# Patient Record
Sex: FEMALE | Race: WHITE | URBAN - METROPOLITAN AREA
[De-identification: names, ages, dates, MRNs, and addresses within clinical notes are randomized per-mention and may not be internally consistent; named-entity substitution may affect disease eponyms.]

---

## 2023-11-07 ENCOUNTER — ON-DEMAND VIRTUAL (OUTPATIENT)
Dept: URGENT CARE | Facility: CLINIC | Age: 28
End: 2023-11-07
Payer: COMMERCIAL

## 2023-11-07 DIAGNOSIS — N89.8 VAGINAL DISCHARGE: ICD-10-CM

## 2023-11-07 DIAGNOSIS — N76.0 ACUTE VAGINITIS: Primary | ICD-10-CM

## 2023-11-07 PROCEDURE — 99213 PR OFFICE/OUTPT VISIT, EST, LEVL III, 20-29 MIN: ICD-10-PCS | Mod: 95,,, | Performed by: NURSE PRACTITIONER

## 2023-11-07 PROCEDURE — 99213 OFFICE O/P EST LOW 20 MIN: CPT | Mod: 95,,, | Performed by: NURSE PRACTITIONER

## 2023-11-07 RX ORDER — CLOTRIMAZOLE AND BETAMETHASONE DIPROPIONATE 10; .64 MG/G; MG/G
CREAM TOPICAL 2 TIMES DAILY
Qty: 30 G | Refills: 0 | Status: SHIPPED | OUTPATIENT
Start: 2023-11-07 | End: 2023-11-21

## 2023-11-07 RX ORDER — FLUCONAZOLE 150 MG/1
150 TABLET ORAL ONCE
Qty: 1 TABLET | Refills: 0 | Status: SHIPPED | OUTPATIENT
Start: 2023-11-07 | End: 2023-11-07

## 2023-11-08 NOTE — PROGRESS NOTES
Subjective:      Patient ID: Karen Zepeda is a 28 y.o. female.    Vitals:  vitals were not taken for this visit.     Chief Complaint: Vaginal Itching      Visit Type: TELE AUDIOVISUAL    Present with the patient at the time of consultation: TELEMED PRESENT WITH PATIENT: colleague/friend    No past medical history on file.  No past surgical history on file.  Review of patient's allergies indicates:  Not on File  No current outpatient medications on file prior to visit.     No current facility-administered medications on file prior to visit.     No family history on file.    Medications Ordered                [id number] - [place], [place] - [address] AT  &    [address], [place] [address]    Telephone: [phone]   Fax: [phone]   Hours: Not open 24 hours                         E-Prescribed (2 of 2)              clotrimazole-betamethasone 1-0.05% (LOTRISONE) cream    Sig: Apply topically 2 (two) times daily. for 14 days       Start: 11/7/23     Quantity: 30 g Refills: 0                         fluconazole (DIFLUCAN) 150 MG Tab    Sig: Take 1 tablet (150 mg total) by mouth once. for 1 dose       Start: 11/7/23     Quantity: 1 tablet Refills: 0                           Ohs Peq Odvv Intake    11/7/2023  8:59 PM CST - Filed by Patient   Describe your reason for todays visit Uti/yeast infection   What is your current physical address in the event of a medical emergency?    Are you able to take your vital signs? No   Please attach any relevant images or files          28 year old female calls in today with complaints of vaginal irritation, intense vaginal itching and vaginal discharge. She states she thought she had a UTI so she took antibiotics that she had at home and then within 2 days of taking them she started with these symptoms. She denies a fishy odor. She states she is not concerned with STD's or pregnancy. Denies any back pain, fever or chills.        Vaginal Discharge  The patient's primary symptoms include genital itching and vaginal discharge. The patient's pertinent negatives include no genital lesions, genital odor, genital rash, missed menses, pelvic pain or vaginal bleeding. This is a new problem. The current episode started in the past 7 days. The problem occurs constantly. The problem has been rapidly worsening. The pain is moderate. The problem affects both sides. She is not pregnant. Pertinent negatives include no abdominal pain, anorexia, back pain, chills, constipation, diarrhea, discolored urine, dysuria, fever, flank pain, frequency, headaches, hematuria, joint pain, joint swelling, nausea, painful intercourse, rash, sore throat, urgency or vomiting. The vaginal discharge was copious, thick and white. There has been no bleeding. She has not been passing clots. She has not been passing tissue.       Constitution: Negative for chills and fever.   HENT:  Negative for sore throat.    Gastrointestinal:  Negative for abdominal pain, nausea, vomiting, constipation and diarrhea.   Genitourinary:  Positive for vaginal discharge. Negative for dysuria, frequency, urgency, flank pain, hematuria, missed menses and pelvic pain.   Musculoskeletal:  Negative for back pain.   Skin:  Negative for rash.   Neurological:  Negative for headaches.        Objective:   The physical exam was conducted virtually.  Physical Exam   Constitutional: She is oriented to person, place, and time. No distress.   HENT:   Head: Normocephalic and atraumatic.   Mouth/Throat: Oropharynx is clear and moist and mucous membranes are normal.   Eyes: Conjunctivae are normal. No scleral icterus.   Pulmonary/Chest: Effort normal. No respiratory distress.   Musculoskeletal: Normal range of motion.         General: Normal range of motion.   Neurological: She is alert and oriented to person, place, and time.   Skin: Skin is not diaphoretic.   Psychiatric: Her behavior is normal. Judgment and  thought content normal.   Vitals reviewed.      Assessment:     1. Acute vaginitis    2. Vaginal discharge        Plan:       Acute vaginitis  -     fluconazole (DIFLUCAN) 150 MG Tab; Take 1 tablet (150 mg total) by mouth once. for 1 dose  Dispense: 1 tablet; Refill: 0    Vaginal discharge    Other orders  -     clotrimazole-betamethasone 1-0.05% (LOTRISONE) cream; Apply topically 2 (two) times daily. for 14 days  Dispense: 30 g; Refill: 0        Discussed with patient that if she continues to have symptoms despite using cream topically and taking oral diflucan that she should seek in person exam. She agrees to plan.     Thank you for choosing [facility]!    Our goal in the [facility] is to always provide outstanding medical care. You may receive a survey by mail or e-mail in the next week regarding your experience today. We would greatly appreciate you completing and returning the survey. Your feedback provides us with a way to recognize our staff who provide very good care, and it helps us learn how to improve when your experience was below our aspiration of excellence.         We appreciate you trusting us with your medical care. We hope you feel better soon. We will be happy to take care of you for all of your future medical needs.    You must understand that you've received an Urgent Care treatment only and that you may be released before all your medical problems are known or treated. You, the patient, will arrange for follow up care as instructed.    Follow up with your PCP or specialty clinic as directed in the next 1-2 weeks if not improved or as needed.  You can call [phone] to schedule an appointment with the appropriate provider.    If your condition worsens we recommend that you receive another evaluation in person, with your primary care provider, urgent care or at the emergency room immediately or contact your primary medical clinics after hours call  service to discuss your concerns.

## 2023-11-29 ENCOUNTER — ON-DEMAND VIRTUAL (OUTPATIENT)
Dept: URGENT CARE | Facility: CLINIC | Age: 28
End: 2023-11-29
Payer: COMMERCIAL

## 2023-11-29 DIAGNOSIS — A60.00 GENITAL HERPES SIMPLEX, UNSPECIFIED SITE: ICD-10-CM

## 2023-11-29 DIAGNOSIS — L02.91 CUTANEOUS ABSCESS, UNSPECIFIED SITE: Primary | ICD-10-CM

## 2023-11-29 PROCEDURE — 99213 OFFICE O/P EST LOW 20 MIN: CPT | Mod: 95,S$GLB,, | Performed by: PHYSICIAN ASSISTANT

## 2023-11-29 PROCEDURE — 99213 PR OFFICE/OUTPT VISIT, EST, LEVL III, 20-29 MIN: ICD-10-PCS | Mod: 95,S$GLB,, | Performed by: PHYSICIAN ASSISTANT

## 2023-11-29 RX ORDER — DOXYCYCLINE HYCLATE 100 MG
100 TABLET ORAL 2 TIMES DAILY
Qty: 14 TABLET | Refills: 0 | Status: SHIPPED | OUTPATIENT
Start: 2023-11-29 | End: 2023-12-06

## 2023-11-29 RX ORDER — VALACYCLOVIR HYDROCHLORIDE 1 G/1
1000 TABLET, FILM COATED ORAL 3 TIMES DAILY
Qty: 30 TABLET | Refills: 0 | Status: SHIPPED | OUTPATIENT
Start: 2023-11-29 | End: 2023-12-09

## 2023-11-29 NOTE — PATIENT INSTRUCTIONS
Your abscess is not fluctuant so it can not be drained.  We will treat with antibiotics and continue with hot soaks and warm compresses multiple times a day.  Advil/Tylenol as needed for pain relief.  Take antibiotic with food no milk products.  Avoid sun exposure as you are more prone to sunburns while on this antibiotic.  Take Valtrex as prescribed for outbreak.  Follow up with primary care as needed.  If your symptoms do not improve please return for an in-person visit to urgent care

## 2023-11-29 NOTE — PROGRESS NOTES
Subjective:      Patient ID: Karen Zepeda is a 28 y.o. female.    Vitals:  vitals were not taken for this visit.     Chief Complaint: Mass (Bump on pubic area)      Visit Type: TELE AUDIOVISUAL    Present with the patient at the time of consultation: TELEMED PRESENT WITH PATIENT: None pt is in [place]    No past medical history on file.  No past surgical history on file.  Review of patient's allergies indicates:  Not on File  No current outpatient medications on file prior to visit.     No current facility-administered medications on file prior to visit.     No family history on file.        No questionnaires on file.    Patient states that she is had an abscess it started about 3 days ago.  She has been using Advil for pain relief only mild relief fatigue.  She is also been using warm compresses and hands tub soaks.  Patient states she has noted some discharge coming out.  Describes it as hard and not soft.  States there are 2 heads to it.  Patient is also asking for a prescription for Valtrex as she is having a herpes outbreak.  Patient has been treated with it before with good results.  Patient denies any fevers, chills, abdominal pain, urinary symptoms, vaginal discharge        Constitution: Negative for chills and fever.   Genitourinary:  Positive for genital sore. Negative for dysuria, vaginal pain, vaginal discharge and vaginal odor.   Skin:  Positive for erythema and abscess.        Objective:   The physical exam was conducted virtually.  Physical Exam   Constitutional: She is oriented to person, place, and time.  Non-toxic appearance. She does not appear ill. No distress.   HENT:   Head: Normocephalic and atraumatic.   Eyes: Conjunctivae are normal. Right eye exhibits no discharge. Left eye exhibits no discharge.   Pulmonary/Chest: Effort normal. No respiratory distress.   Abdominal: Normal appearance.   Neurological: She is alert and oriented to person, place, and time.   Skin: Skin is warm, dry, not  diaphoretic, not pale and no rash. erythema No bruising        Nursing note and vitals reviewed.      Assessment:     1. Cutaneous abscess, unspecified site    2. Genital herpes simplex, unspecified site        Plan:       Cutaneous abscess, unspecified site  -     doxycycline (VIBRA-TABS) 100 MG tablet; Take 1 tablet (100 mg total) by mouth 2 (two) times daily. for 7 days  Dispense: 14 tablet; Refill: 0    Genital herpes simplex, unspecified site  -     valACYclovir (VALTREX) 1000 MG tablet; Take 1 tablet (1,000 mg total) by mouth 3 (three) times daily. for 10 days  Dispense: 30 tablet; Refill: 0      I have reviewed the patient chart and pertinent past imaging/labs.  Patient Instructions   Your abscess is not fluctuant so it can not be drained.  We will treat with antibiotics and continue with hot soaks and warm compresses multiple times a day.  Advil/Tylenol as needed for pain relief.  Take antibiotic with food no milk products.  Avoid sun exposure as you are more prone to sunburns while on this antibiotic.  Take Valtrex as prescribed for outbreak.  Follow up with primary care as needed.  If your symptoms do not improve please return for an in-person visit to urgent care             The patient location is: [place]   The chief complaint leading to consultation is: abscess, herpes outbreak    Visit type: audiovisual    Face to Face time with patient: 10 min  20 minutes of total time spent on the encounter, which includes face to face time and non-face to face time preparing to see the patient (eg, review of tests), Obtaining and/or reviewing separately obtained history, Documenting clinical information in the electronic or other health record, Independently interpreting results (not separately reported) and communicating results to the patient/family/caregiver, or Care coordination (not separately reported).         Each patient to whom he or she provides medical services by telemedicine is:  (1) informed of the  relationship between the physician and patient and the respective role of any other health care provider with respect to management of the patient; and (2) notified that he or she may decline to receive medical services by telemedicine and may withdraw from such care at any time.    Notes: